# Patient Record
Sex: MALE | Race: WHITE | NOT HISPANIC OR LATINO | Employment: FULL TIME | URBAN - METROPOLITAN AREA
[De-identification: names, ages, dates, MRNs, and addresses within clinical notes are randomized per-mention and may not be internally consistent; named-entity substitution may affect disease eponyms.]

---

## 2022-11-14 ENCOUNTER — HOSPITAL ENCOUNTER (EMERGENCY)
Facility: HOSPITAL | Age: 27
Discharge: HOME | End: 2022-11-14
Admitting: PHYSICIAN ASSISTANT
Payer: OTHER MISCELLANEOUS

## 2022-11-14 VITALS
HEIGHT: 69 IN | BODY MASS INDEX: 33.33 KG/M2 | WEIGHT: 225 LBS | TEMPERATURE: 97.4 F | OXYGEN SATURATION: 97 % | RESPIRATION RATE: 18 BRPM | DIASTOLIC BLOOD PRESSURE: 67 MMHG | SYSTOLIC BLOOD PRESSURE: 120 MMHG | HEART RATE: 83 BPM

## 2022-11-14 DIAGNOSIS — S61.209A AVULSION OF SKIN OF FINGER, INITIAL ENCOUNTER: ICD-10-CM

## 2022-11-14 PROCEDURE — 272N000004 HC RX 272: Performed by: PHYSICIAN ASSISTANT

## 2022-11-14 PROCEDURE — 99283 EMERGENCY DEPT VISIT LOW MDM: CPT

## 2022-11-14 PROCEDURE — 64450 NJX AA&/STRD OTHER PN/BRANCH: CPT

## 2022-11-14 RX ADMIN — Medication: at 22:34

## 2022-11-14 ASSESSMENT — ENCOUNTER SYMPTOMS: WOUND: 1

## 2022-11-14 ASSESSMENT — ACTIVITIES OF DAILY LIVING (ADL): ADLS_ACUITY_SCORE: 33

## 2022-11-15 NOTE — DISCHARGE INSTRUCTIONS
As we discussed, you have an avulsion injury to the tip of your thumb which is where you have a piece of missing skin.  These cannot be repaired with stitches, and have to heal on their own.  Please keep our dressing on for 24 to 48 hours.  After that you may take it off, keep the wound clean and dry, you may apply daily bacitracin to prevent infection, and I would wear the finger splint to protect the wound until it heals.  Please follow-up in your clinic this week to make sure wound is healing appropriately.  If it anytime you develop fever, redness, swelling, or pus like discharge from the wound, or bleeding through bandage, please return to the ER for further evaluation.    Your blood pressure was elevated in the emergencydepartment today and requires recheck and close follow-up in your primary care clinic. Untreated blood pressure can cause serious complications including, but not limited to stroke, heart attack/failure, and kidney disease.  Please make a close follow-up appointment to have this recheck performed. Please return to the emergency department immediately if you develop a severe headache, vision changes, chest pain, shortness of breath, orabdominal pain.

## 2022-11-15 NOTE — ED PROVIDER NOTES
Emergency Department Encounter   NAME: Jimmy Huber ; AGE: 27 year old male ; YOB: 1995 ; MRN: [id number] ; PCP: No Ref-Primary, Physician   ED PROVIDER: Miya Hanson PA-C    Evaluation Date & Time:   No admission date for patient encounter.    CHIEF COMPLAINT:  Laceration      Impression and Plan   MDM: Jimmy Huber is a 27 year old male with a pertinent history of ADD and major depression who presents to the ED by walking for evaluation of left thumb laceration.  Patient sliced his nondominant left hand thumb with a kitchen knife while at work several hours prior to arrival in the ER.  On exam, he sustained an avulsion injury measuring 1 x 1 cm to the distal tip of his left thumb, mostly involving skin layers though central area down to the subcutaneous tissue.  Wound actively losing, and bleeding has not subsided with direct pressure.  Soaked a cottonball in 1% lidocaine with epinephrine, and had patient apply with pressure to the wound.  No evidence of foreign body in the wound.  Surrounding sensation and circulation intact as well as normal range of motion intact strength at both thumb joints.  No concern for tendon injury.  His tetanus is up-to-date.  No bony involvement or concern for open fracture.  As wound is an avulsion, there is no suturable skin, and wound will be allowed to heal by secondary intent which I discussed with the patient and he is comfortable with.  Bleeding subsided after lidocaine and epinephrine soaked cottonball, and nursing staff thoroughly cleaned the wound, Surgicel powder to apply over the wound, and bulky finger dressing with long finger splint applied by nursing staff.  Reviewed with patient monitoring for signs of infection, following up in his clinic this week to make sure this resolves, as well as concerning signs and symptoms to return to the ER, wound care for home, and wearing splint until healed.  Patient verbalized understanding is comfortable the plan.   Discharged home in good condition.    ED COURSE:  9:45 PM I met and introduced myself to the patient. I gathered initial history and performed my physical exam. We discussed plan for initial workup and I preformed a digital block.   10:17 PM Re-evaluated the wound and bleeding has stopped. Discussed wound coverage with nurse. We discussed the plan for discharge and the patient is agreeable. Reviewed supportive cares, symptomatic treatment, outpatient follow up, and reasons to return to the Emergency Department. Patient to be discharged by ED RN.     At the conclusion of the encounter I discussed the results of all the tests and the disposition. The questions were answered. The patient or family acknowledged understanding and was agreeable with the care plan.    FINAL IMPRESSION:    ICD-10-CM    1. Avulsion of skin of finger, initial encounter  S61.209A             MEDICATIONS GIVEN IN THE EMERGENCY DEPARTMENT:  Medications   oxidized cellulose (SURGICEL) pad ( Topical Given 11/14/22 2234)         NEW PRESCRIPTIONS STARTED AT TODAY'S ED VISIT:  There are no discharge medications for this patient.        HPI   Patient information was obtained from: Patient   Use of Intrepreter: N/A     Jimmy Huber is a 27 year old male with a pertinent history of ADD and major depression who presents to the ED by walking for evaluation of left thumb laceration.    At ~7:40 PM, patient was at work and sliced the tip of his left thumb with a 's knife. 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 Patient notes that he is missing the tip of his left thumb as well as a bit of the nail. Of note, patient is right handed and his last TDAP was 09/07/2020.     REVIEW OF SYSTEMS:  Review of Systems   Skin: Positive for wound (left thumb laceration).   All other systems reviewed and are negative.      Medical History     History reviewed. No pertinent past medical history.    History  "reviewed. No pertinent surgical history.    History reviewed. No pertinent family history.         No current outpatient medications on file.        Physical Exam     First Vitals:  Patient Vitals for the past 24 hrs:   BP Temp Temp src Pulse Resp SpO2 Height Weight   11/14/22 2245 120/67 -- -- 83 -- 97 % -- --   11/14/22 2037 (!) 145/70 97.4  F (36.3  C) Temporal 80 18 98 % 1.753 m (5' 9\") 102.1 kg (225 lb)         PHYSICAL EXAM:   Physical Exam  Vitals and nursing note reviewed.   Constitutional:       General: He is not in acute distress.     Appearance: Normal appearance. He is not toxic-appearing.   HENT:      Head: Normocephalic.   Eyes:      Conjunctiva/sclera: Conjunctivae normal.   Pulmonary:      Effort: Pulmonary effort is normal.   Musculoskeletal:      Comments: Circular 1x1cm area of skin avulsion to the distal left thumb fingertip.  Central area down to the subcutaneous tissue though mostly through superficial skin.  No nailbed involvement.  Surrounding sensation and circulation intact surrounding cap refill less than 2 seconds.  Full active range of motion and intact strength at MCP and interphalangeal joint.  Continued active oozing from the wound.  No foreign body in the wound.   Skin:     General: Skin is warm and dry.   Neurological:      Mental Status: He is alert.             Results     LAB:  All pertinent labs reviewed and interpreted  Labs Ordered and Resulted from Time of ED Arrival to Time of ED Departure - No data to display    RADIOLOGY:  No orders to display     PROCEDURES:  PROCEDURE: Digital Block   INDICATIONS: Left thumb avulsion injury    PROCEDURE PROVIDER: Miya Hanson PA-C   SITE: left thumb    MEDICATION: 1% Lidocaine   NOTE: The skin overlying the site for injection was prepped with chlorhexidine.  Needle was inserted in a standard three point injection pattern.  Each time the area was aspirated and there was no return of blood.  I then injected the medication at the base " of the digit.  A total of 6 ml of the medication was injected.  The patient had a good response to the procedure   COMPLICATIONS: None       I, Esperanza Massey, am serving as a scribe to document services personally performed by Miya Hanson PA-C, based on my observation and the provider's statements to me. I, Miya Hanson PA-C attest that Esperanza Massey is acting in a scribe capacity, has observed my performance of the services and has documented them in accordance with my direction.       Miya Hanson PA-C   Emergency Medicine   [facility] EMERGENCY DEPARTMENT     Miya Hanson PA-C  11/14/22 5298

## 2022-11-15 NOTE — ED TRIAGE NOTES
About 20 mins prior to arrival, pt sliced left thumb on a knife at work. Pt is not on blood thinners. Coworker who is a paramedic wrapped up finger. No obvious bleeding through the dressing. Last tdap 9/7/2020. Pt's description of wound is avulsion.

## 2024-11-17 ENCOUNTER — OFFICE VISIT (OUTPATIENT)
Dept: URGENT CARE | Facility: URGENT CARE | Age: 29
End: 2024-11-17
Payer: COMMERCIAL

## 2024-11-17 VITALS
HEART RATE: 98 BPM | TEMPERATURE: 98.6 F | BODY MASS INDEX: 33.97 KG/M2 | RESPIRATION RATE: 16 BRPM | WEIGHT: 230 LBS | SYSTOLIC BLOOD PRESSURE: 112 MMHG | DIASTOLIC BLOOD PRESSURE: 80 MMHG | OXYGEN SATURATION: 98 %

## 2024-11-17 DIAGNOSIS — K21.00 GASTROESOPHAGEAL REFLUX DISEASE WITH ESOPHAGITIS, UNSPECIFIED WHETHER HEMORRHAGE: Primary | ICD-10-CM

## 2024-11-17 PROBLEM — L21.9 SEBORRHEIC DERMATITIS: Status: ACTIVE | Noted: 2018-07-24

## 2024-11-17 PROBLEM — G47.33 OSA (OBSTRUCTIVE SLEEP APNEA): Status: ACTIVE | Noted: 2017-02-02

## 2024-11-17 PROCEDURE — 99203 OFFICE O/P NEW LOW 30 MIN: CPT

## 2024-11-17 RX ORDER — DULOXETIN HYDROCHLORIDE 30 MG/1
30 CAPSULE, DELAYED RELEASE ORAL
COMMUNITY
Start: 2023-05-22

## 2024-11-17 RX ORDER — FAMOTIDINE 20 MG/1
20 TABLET, FILM COATED ORAL 2 TIMES DAILY
Qty: 30 TABLET | Refills: 0 | Status: SHIPPED | OUTPATIENT
Start: 2024-11-17

## 2024-11-17 NOTE — PATIENT INSTRUCTIONS
Diagnosis: GERD    Plan:   Control symptoms- with acid suppression: PPI, proton pump inhibitors for 8 weeks   Antacids:   Maalox, Mylanta, Rolaids, Tums    H2 receptor antagonists:   Famotidine (Pepcid)   PPI     Omeprazole (Prilosec)   Prevent symptoms -   Weight loss, smoking cessation  Head of bed elevation   Avoidance of late night eating  Food elimination: chocolate, caffeine, alcohol, acidic foods, spicy foods  Follow up with PCP   Discuss long-term use of PPIs and other treatment options   Possibly need an upper endoscopy for further evaluation and treatment     Monitor for:   Symptoms are not improving   Shortness of breath   Bleeding in stool or bleeding in vomit   Decreased ability to eat   Weight loss - unexplained   Difficulty swallowing         Acid Reflux, Heartburn, GERD gastroesophageal reflux disease  This is the burning sensation in your chest/throat or stomach.   It is related to your digestive system.   Most people experience heartburn occasionally, and have only mild discomfort.   But for some, it can be a chronic and painful problem.  This condition is caused by a leak of stomach acid. When you swallow, food and liquid pass down your esophagus. This is the tube that travels from your throat to your stomach. At the base of your esophagus is a tight band of muscle that acts as a valve.   Normally, it relaxes to allow food and liquid to pass into your stomach and then clamps shut to trap the contents inside. But sometimes acid can be pushed up through this opening.   The acid irritates the lining of your esophagus, causing the sensation of heartburn.   In some people, the valve may be abnormally weak, or it may function poorly this is GERD.    can be triggered by certain foods.   Spicy foods, fatty foods, citrus and tomato products are common culprits.   It can be triggered by alcohol, caffeine and carbonated drinks.   You have a higher risk for heartburn if you are overweight.   And it can be a  problem for women during pregnancy.  Symptoms include a burning pain in your chest.   This may be worse after eating.   It may be worse when you lie down.   You may also have a sour, bitter taste in your mouth.   If you have GERD, you may have symptoms such as:   chest pain, a dry cough and a sore throat, difficulty swallowing, or you may be hoarse  Chronic PPI use, not recommended as can cause:    - gastric acid suppression- hypergastrinemia: rebound hyperacidity after d/c       Can avoid w/ a slow taper   - acid suppression can cause increase gastric pH and possible over growth of non-h.pylori bacteria, potentially leading to micro-aspiration & lung colonization  - C. diff risk, diarrhea- low gastric acid from PPI = loss of defense mechanism against pathogens colonizing in GI   - can cause micronutrient loss         -hypomagnesemia, B12, calcium   - osteoporosis or fractures- causative agent thought to be that acid suppression causes reduced absorption of minerals like Calcium - 40% reduction in mic absorption   - dementia? Possibly increase production and degradation of amyloid, reducing B12, which may increase risk of dementia - very weak relationship   PPI can be long-term, BUT not FDA approved use, Only FDA approved for 3 months

## 2024-11-17 NOTE — PROGRESS NOTES
URGENT CARE  Assessment & Plan   Assessment:   Jimmy Huber is a 29 year old male who's clinical presentation today is consistent with:   1. Gastroesophageal reflux disease    - famotidine (PEPCID) 20 MG tablet;   Plan:  Will have patient try OTC antacids and an H2 antagonist, if patient still having symptoms after a week of antacids and Pepcid then can try a PPI   Discussed with patient that the normal course of treatment with a PPI is 8 weeks and that he will need to follow up with is PCP regarding continued treatment, vs taper. We discussed that long-term use of PPIs are not approved by the FDA, yet many people do take them for long-term.  Patient notified they need to follow up with PCP, specifically for further evaluation and treatment and potential for the need for an upper endoscopy to rule out more serious conditions, ulcer vs h.pylori, or malignancy.   Educated patient on the importance of life style changes and prevention including: weight loss, smoking cessation, HOB elevation, nocturnal eating avoidance, food elimination attempts (spicy, chocolate, acidic).   Additionally we discussed if symptoms do not improve after starting today's treatment to follow up in 1-2 months , sooner if symptoms worsen, return precautions given  No alarm signs or symptoms present   Differential Diagnoses for this patient's chief complaint that I considered include:  ACS, stable angina, achalasia, PUD, h.pylori, Functional: dyspepsia, heartburn/reflux, Eosinophilic esophagitis, malignancy, Laryngopharyngeal reflux     Patient is agreeable to treatment plan and state they will follow-up if symptoms do not improve and/or if symptoms worsen   see patient's AVS 'monitor for' section for specific patient instructions given and discussed regarding what to watch for and when to follow up    DANAE Pandya CNP  [facility] URGENT CARE  [facility]      ______________________________________________________________________      Subjective     HPI: Jimmy Huber  is a 29 year old  male who presents today for evaluation the following concerns:   Patient presents endorsing acid reflux for 1-2 days.   Patient states they have noted heartburn (specifically a burning in the chest after meals), acid regurgitation/reflux (a sour bitter taste in the mouth)   Patient denies: weight loss, dysphagia, odynophagia (painful swallowing), or blood in vomit or blood in stool; tory feels a bit nauseated but hasn't vomited    Patient denies: voice changes/recent laryngtis, noticed new tooth ersion or halitosis   Patient denies having had an upper endoscopy in the past, diagnosis of GERD/PUD   Medications tried: nothing yet otc      Review of Systems:  Pertinent review of systems as reflected in HPI, otherwise negative.     Objective    Physical Exam:  Vitals:    11/17/24 1414   BP: 112/80   Pulse: 98   Resp: 16   Temp: 98.6  F (37  C)   TempSrc: Tympanic   SpO2: 98%   Weight: 104.3 kg (230 lb)      General: alert and in no acute distress, VS stable/reviewed    Head: atraumatic, normocephalic, PERRLA   Lungs:  nonlabored  CV:  extremities warm and perfused  Skin: no rashes, no diaphoresis and skin color normal  Neuro: Patient awake, alert, speech is fluent,     Abdominal: no pain to palpation   ______________________________________________________________________    I explained my diagnostic considerations and recommendations to the patient, who voiced understanding and agreement with the treatment plan.   All questions were answered.   We discussed potential side effects, risks and benefits of any prescribed or recommended therapies, as well as expectations for response to treatments.  Please see AVS for any patient instructions & handouts given.   Patient was advised to contact the Nurse Care Line, their Primary Care provider, Urgent Care, or the Emergency Department  if there are new or worsening symptoms, or call 911 for emergencies.

## 2024-11-19 ENCOUNTER — LAB REQUISITION (OUTPATIENT)
Dept: LAB | Facility: CLINIC | Age: 29
End: 2024-11-19

## 2024-11-19 DIAGNOSIS — K21.9 GASTRO-ESOPHAGEAL REFLUX DISEASE WITHOUT ESOPHAGITIS: ICD-10-CM

## 2024-11-19 LAB
ALBUMIN SERPL BCG-MCNC: 4.4 G/DL (ref 3.5–5.2)
ALP SERPL-CCNC: 81 U/L (ref 40–150)
ALT SERPL W P-5'-P-CCNC: 14 U/L (ref 0–70)
ANION GAP SERPL CALCULATED.3IONS-SCNC: 16 MMOL/L (ref 7–15)
AST SERPL W P-5'-P-CCNC: 15 U/L (ref 0–45)
BILIRUB SERPL-MCNC: 0.6 MG/DL
BUN SERPL-MCNC: 12.9 MG/DL (ref 6–20)
CALCIUM SERPL-MCNC: 9.6 MG/DL (ref 8.8–10.4)
CHLORIDE SERPL-SCNC: 101 MMOL/L (ref 98–107)
CREAT SERPL-MCNC: 0.83 MG/DL (ref 0.67–1.17)
EGFRCR SERPLBLD CKD-EPI 2021: >90 ML/MIN/1.73M2
GLUCOSE SERPL-MCNC: 98 MG/DL (ref 70–99)
HCO3 SERPL-SCNC: 20 MMOL/L (ref 22–29)
POTASSIUM SERPL-SCNC: 3.8 MMOL/L (ref 3.4–5.3)
PROT SERPL-MCNC: 7.8 G/DL (ref 6.4–8.3)
SODIUM SERPL-SCNC: 137 MMOL/L (ref 135–145)

## 2024-11-19 PROCEDURE — 80053 COMPREHEN METABOLIC PANEL: CPT

## 2025-03-29 ENCOUNTER — LAB REQUISITION (OUTPATIENT)
Dept: LAB | Facility: CLINIC | Age: 30
End: 2025-03-29
Payer: COMMERCIAL

## 2025-03-29 PROCEDURE — 87491 CHLMYD TRACH DNA AMP PROBE: CPT | Mod: ORL

## 2025-03-29 PROCEDURE — 87086 URINE CULTURE/COLONY COUNT: CPT | Mod: ORL

## 2025-03-31 LAB
BACTERIA UR CULT: NO GROWTH
C TRACH DNA SPEC QL PROBE+SIG AMP: NEGATIVE
N GONORRHOEA DNA SPEC QL NAA+PROBE: NEGATIVE
SPECIMEN TYPE: NORMAL